# Patient Record
Sex: FEMALE | Race: WHITE
[De-identification: names, ages, dates, MRNs, and addresses within clinical notes are randomized per-mention and may not be internally consistent; named-entity substitution may affect disease eponyms.]

---

## 2017-02-25 NOTE — PN
Hospitalist Progress Note


HOSPITALIST ADDENDUM


Patient seen and examined at bedside.


She states her abdominal pain and nausea are resolved, but feels very tired.


The cause of this episode seems to be non-compliance as she missed her Lantus 

dose yesterday and does not remember how much Humalog she was taking. No signs 

or symptoms of infection at this time.


AG is now closed, glucose is <200. Will resume Lantus, start diet, continue IVF 

and titrate insulin drip to off.


Continue to monitor in ICU.

## 2017-02-25 NOTE — ED
George SEWELL Salem, scribed for [place] on 02/25/17 at 0452 .





HPI Diabetic





- HPI Summary


HPI Summary: 


Patient is a 20 y/o female who presents to the ED for acute vomiting and nausea 

since last night begin at 2230. She reports dyspnea, abd pain, and dehydration. 

Pt denies fever. She reports having EtOH at 1900. 





- History Of Current Complaint


Chief Complaint: EDDiabeticProb


Time Seen by Provider: 02/25/17 04:36


Hx Obtained From: Patient


Onset/Duration: Sudden Onset


Severity Initially: Moderate


Severity Currently: Moderate


Aggravating: Nothing


Alleviating: Nothing


Associated Signs & Symptoms: Abdominal Pain, Nausea, Shortness of Breath, 

Vomiting





- Allergies/Home Medications


Allergies/Adverse Reactions: 


 Allergies











Allergy/AdvReac Type Severity Reaction Status Date / Time


 


No Known Allergies Allergy   Verified 02/25/17 04:47














PMH/Surg Hx/FS Hx/Imm Hx


Endocrine/Hematology History: Reports: Hx Diabetes


Infectious Disease History: No


Infectious Disease History: 


   Denies: Traveled Outside the [place] in Last 30 Days





- Family History


Known Family History: Positive: Diabetes - brother. 


   Negative: Cardiac Disease





- Social History


Alcohol Use: Occasionally


Hx Tobacco Use: No





Review of Systems


Positive: Other - Dehydration. .  Negative: Fever


Positive: Shortness Of Breath


Positive: Abdominal Pain, Vomiting, Nausea


All Other Systems Reviewed And Are Negative: Yes





Physical Exam


Triage Information Reviewed: Yes


Vital Signs On Initial Exam: 


 Initial Vitals











Temp Pulse Resp BP Pulse Ox


 


 98.4 F   115   26   89/63   97 


 


 02/25/17 04:26  02/25/17 04:26  02/25/17 04:26  02/25/17 04:26  02/25/17 04:26











Vital Signs Reviewed: Yes


Appearance: Positive: No Pain Distress


Skin: Positive: Warm, Skin Color Reflects Adequate Perfusion, Dry


Head/Face: Positive: Normal Head/Face Inspection


Eyes: Positive: EOMI, MADHAVI


ENT: Positive: Other - DMM.


Neck: Positive: Supple, Nontender


Respiratory/Lung Sounds: Positive: Clear to Auscultation, Breath Sounds Present


Cardiovascular: Positive: RRR, Pulses are Symmetrical in both Upper and Lower 

Extremities


Abdomen Description: Positive: Other: - Tenderness in right and left lower 

quadrant.


Bowel Sounds: Positive: Present


Musculoskeletal: Positive: Normal, Strength/ROM Intact


Neurological: Positive: Normal, Sensory/Motor Intact, Alert, Oriented to Person 

Place, Time





Diagnostics





- Vital Signs


 Vital Signs











  Temp Pulse Resp BP Pulse Ox


 


 02/25/17 04:26  98.4 F  115  26  89/63  97














- Laboratory


Result Diagrams: 


 02/25/17 04:42





 02/25/17 04:42


Lab Statement: Any lab studies that have been ordered have been reviewed, and 

results considered in the medical decision making process.





- EKG


  ** 0428


EKG Interpretation: Sinus tachycardia @ 111 bmp.





Diabetic Course/Dx





- Diagnoses


Provider Diagnoses: 


 DKA (diabetic ketoacidoses), Abdominal pain








- Physician Notifications


Discussed Care of Patient With: Dr. Frankenber (hospitalist) @ 0514. Will admit.





- Critical Care Time


Critical Care Time: 30-74 min - DKA.





Discharge





- Discharge Plan


Condition: Stable


Disposition: ADMITTED TO [facility]





The documentation as recorded by the George valadez Salem accurately reflects 

the service I personally performed and the decisions made by Emile madden Emmanuel.

## 2017-02-25 NOTE — HP
H&P (Free Text)


History and Physical: 





PCP: Herrera





Date/Time of Evaluation: 02/25/2017 0530





CC: abdominal cramping, N/V, hyperglycemia





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 

being in her usual state of health yesterday until around 2200 when she began 

having diffuse moderate abdominal cramping associated w/ N/V/D. She admits to 

dry cough, but denies F/C, headache, sore throat, B/U/F of urine, or other 

issues. She denies chest pain, SOB, palpitations, light-headedness, or other 

issues.





Work up is notable for a glucose of 633 w/ a serum CO2 of 11 and pH of 7.0. 

Admission will be to ICU for management of DKA. She states she has only been 

hospitalized for diabetic issues twice in her life.





PMedHx


IDDM





Allergies


No Known Allergies Allergy (Verified 02/25/17 04:47)





Ambulatory Orders


Humalog  02/25/17 


Lantus 38 units INJ DAILY 02/25/17 





PSurgHx


L lateral thigh I&D 2nd abscess from insulin pump





SocHx: denies tobacco and recreational drugs, social alcohol; single, no 

children; [employer] student studying computer science; full code status





FamHx: twin brother: IDDM





ROS: as above, otherwise reviewed and all were negative





Constitutional: NAD, normally developed, well-nourished white female


 


vitals: 


 Vital Signs











Temp  36.9 C   02/25/17 06:10


 


Pulse  112   02/25/17 06:10


 


Resp  16   02/25/17 06:10


 


BP  105/74   02/25/17 06:10


 


Pulse Ox  99   02/25/17 06:10








 Intake & Output











 02/24/17 02/24/17 02/25/17





 11:59 23:59 11:59


 


Intake Total   2000


 


Balance   2000


 


Weight   61.235 kg


 


Intake:   


 


  IV Fluids   2000








HEENM: atraumatic; sclera/conjunctiva: non-icteric/clear; hearing: clinically 

intact; oropharynx: clear, mucosa moist





Neck: soft tissue: non-tender; thyroid: normal





Pulmonary: clear to auscultation bilaterally, good aeration, no accessory 

muscle use





CV: RR/RR, normal S1S2, no carotid bruit, no jugular venous distention, 2+ B DP/

PT, no edema





Abdominal: soft, non-distended, mildly diffusely tender, no rebound/guarding/

rigidity, normoactive bowel sounds, no hepatosplenomegaly or masses, no 

costovertebral angle tenderness





Musculoskeletal: general: grossly intact; gait: stable





Integumental: normal appearance and texture





Psychiatric 


orientation: AA&O to PPS


affect: calm


mood: cooperative


eye contact: fair


content: reliable


responses: timely


insight: fair to good





Testing: 


 Lab Results











  02/25/17 02/25/17 02/25/17 Range/Units





  04:42 04:42 04:42 


 


WBC  10.1    (3.5-10.8)  10^3/ul


 


RBC  4.68    (4.0-5.4)  10^6/ul


 


Hgb  13.9    (12.0-16.0)  g/dl


 


Hct  43    (35-47)  %


 


MCV  92    (80-97)  fL


 


MCH  30    (27-31)  pg


 


MCHC  32    (31-36)  g/dl


 


RDW  13    (10.5-15)  %


 


Plt Count  166    (150-450)  10^3/ul


 


MPV  10    (7.4-10.4)  um3


 


Neut % (Auto)  87.9 H    (38-83)  %


 


Lymph % (Auto)  8.4 L    (25-47)  %


 


Mono % (Auto)  2.6    (1-9)  %


 


Eos % (Auto)  0.3    (0-6)  %


 


Baso % (Auto)  0.8    (0-2)  %


 


Absolute Neuts (auto)  8.9 H    (1.5-7.7)  10^3/ul


 


Absolute Lymphs (auto)  0.8 L    (1.0-4.8)  10^3/ul


 


Absolute Monos (auto)  0.3    (0-0.8)  10^3/ul


 


Absolute Eos (auto)  0    (0-0.6)  10^3/ul


 


Absolute Basos (auto)  0.1    (0-0.2)  10^3/ul


 


Absolute Nucleated RBC  0    10^3/ul


 


Nucleated RBC %  0    


 


VBG pH     (7.33-7.43)  


 


VBG pCO2     (41-51)  mmHg


 


VBG pO2     (35-45)  mmHg


 


VBG HCO3     (24-28)  mmol/L


 


VBG O2 Saturation     (70-80)  %


 


VBG Base Excess     (0-4)  


 


Sodium   132 L   (133-145)  mmol/L


 


Potassium   TNP   


 


Chloride   95 L   (101-111)  mmol/L


 


Carbon Dioxide   11 L*   (22-32)  mmol/L


 


Anion Gap   TNP   


 


BUN   17   (6-24)  mg/dL


 


Creatinine   0.88   (0.51-0.95)  mg/dL


 


Est GFR ( Amer)   103.3   (>60)  


 


Est GFR (Non-Af Amer)   80.4   (>60)  


 


BUN/Creatinine Ratio   19.3   (8-20)  


 


Glucose   633 H*   ()  mg/dL


 


POC Glucose (mg/dL)     ()  mg/dL


 


Lactic Acid    2.4 H*  (0.5-2.0)  mmol/L


 


Calcium   9.7   (8.6-10.3)  mg/dL


 


Magnesium   2.0   (1.9-2.7)  mg/dL


 


Total Bilirubin   0.60   (0.2-1.0)  mg/dL


 


AST   TNP   


 


ALT   28   (7-52)  U/L


 


Alkaline Phosphatase   88   ()  U/L


 


Total Protein   7.3   (6.4-8.9)  g/dL


 


Albumin   4.3   (3.2-5.2)  g/dL


 


Globulin   3.0   (2-4)  g/dL


 


Albumin/Globulin Ratio   1.4   (1-3)  


 


Lipase   < 10 L   (11.0-82.0)  U/L


 


TSH   0.76   (0.34-5.60)  mcIU/mL


 


Beta HCG, Quant   < 0.60   mIU/mL














  02/25/17 02/25/17 02/25/17 Range/Units





  04:42 04:46 05:24 


 


WBC     (3.5-10.8)  10^3/ul


 


RBC     (4.0-5.4)  10^6/ul


 


Hgb     (12.0-16.0)  g/dl


 


Hct     (35-47)  %


 


MCV     (80-97)  fL


 


MCH     (27-31)  pg


 


MCHC     (31-36)  g/dl


 


RDW     (10.5-15)  %


 


Plt Count     (150-450)  10^3/ul


 


MPV     (7.4-10.4)  um3


 


Neut % (Auto)     (38-83)  %


 


Lymph % (Auto)     (25-47)  %


 


Mono % (Auto)     (1-9)  %


 


Eos % (Auto)     (0-6)  %


 


Baso % (Auto)     (0-2)  %


 


Absolute Neuts (auto)     (1.5-7.7)  10^3/ul


 


Absolute Lymphs (auto)     (1.0-4.8)  10^3/ul


 


Absolute Monos (auto)     (0-0.8)  10^3/ul


 


Absolute Eos (auto)     (0-0.6)  10^3/ul


 


Absolute Basos (auto)     (0-0.2)  10^3/ul


 


Absolute Nucleated RBC     10^3/ul


 


Nucleated RBC %     


 


VBG pH    7.09 L  (7.33-7.43)  


 


VBG pCO2    27 L  (41-51)  mmHg


 


VBG pO2    49 H  (35-45)  mmHg


 


VBG HCO3    8.8 L  (24-28)  mmol/L


 


VBG O2 Saturation    81.7 H  (70-80)  %


 


VBG Base Excess    -20.2 L  (0-4)  


 


Sodium     (133-145)  mmol/L


 


Potassium  4.4    


 


Chloride     (101-111)  mmol/L


 


Carbon Dioxide     (22-32)  mmol/L


 


Anion Gap     


 


BUN     (6-24)  mg/dL


 


Creatinine     (0.51-0.95)  mg/dL


 


Est GFR ( Amer)     (>60)  


 


Est GFR (Non-Af Amer)     (>60)  


 


BUN/Creatinine Ratio     (8-20)  


 


Glucose     ()  mg/dL


 


POC Glucose (mg/dL)   > 444 H*   ()  mg/dL


 


Lactic Acid     (0.5-2.0)  mmol/L


 


Calcium     (8.6-10.3)  mg/dL


 


Magnesium     (1.9-2.7)  mg/dL


 


Total Bilirubin     (0.2-1.0)  mg/dL


 


AST  33    


 


ALT     (7-52)  U/L


 


Alkaline Phosphatase     ()  U/L


 


Total Protein     (6.4-8.9)  g/dL


 


Albumin     (3.2-5.2)  g/dL


 


Globulin     (2-4)  g/dL


 


Albumin/Globulin Ratio     (1-3)  


 


Lipase     (11.0-82.0)  U/L


 


TSH     (0.34-5.60)  mcIU/mL


 


Beta HCG, Quant     mIU/mL








ECG, personally reviewed: sinus tachycardia rate 111, no ischemia





CXR, personally reviewed: ordered, pending





Impression: 21F HX IDDM presenting w/ DKA, no obvious etiology





DIAGNOSIS & PLAN


Primary 


DKA


: ICU monitoring


: insulin GTT to correct acidosis


: aggressive IVFs for volume repletion


: close monitoring of electrolytes


: supplemental oxygen


: supportive care





Admission Rational: inpatient for ICU management of life-threatening DKA


DVTp: GEORGE


Code Status: full

## 2017-02-25 NOTE — RAD
INDICATION: Diabetic ketoacidosis. Possible infection.     



COMPARISON: None

 

TECHNIQUE: An AP portable view obtained at 0805 hours is submitted.



FINDINGS: 



Bones/Soft Tissues: There are no acute bony findings.    



Cardiomediastinal: The cardiomediastinal silhouette is normal. 



Lungs: There are no infiltrates.



Pleura: There are no pleural effusions. 



Other: None



IMPRESSION:  NO ACTIVE DISEASE.

## 2017-02-26 NOTE — DS
DISCHARGE SUMMARY:

 

DATE OF ADMISSION:  02/25/17

 

DATE OF DISCHARGE:  02/26/17

 

DISCHARGE DIAGNOSIS:  Diabetic ketoacidosis.

 

SECONDARY DIAGNOSIS:  Type 1 diabetes.

 

HOSPITAL COURSE:  Ms. Mcduffie is a 21-year-old lady with a past medical as stated 
above that presented to the emergency room with complaints of abdominal pain, 
nausea, vomiting that had started the night prior to admission.  Although she 
denied any other symptoms or precipitating factors, she was found to be in DKA 
with a glucose of 633, pH of 7.09.  She was admitted to the intensive care unit 
for further management and although, she denied any precipitating factors.  
Later on, she admitted that she had skipped a dose of Lantus and she has not 
been using her Humalog sliding scale as she should.

 

The patient was started on an insulin drip, aggressive IV fluid, and 
electrolyte replacement, and she had progressive improvement.  Her glucose 
level normalized, her anion gap closed, and her acidosis is much improved.

 

The patient was able to tolerate an oral diet and was transitioned back to her 
regimen of Lantus and Humalog.

 

The patient received education regarding her diagnosis and the importance of 
followup.  She will follow up at [place] and she was also referred to the 
[facility] to help her manage her diabetes.

 

The patient states that she has enough insulin at home and she will just need 
more needles, but she did not want me to send prescriptions to a local 
pharmacy.  She states that she will pick them up at [place].

 

The patient is medically stable for discharge at this time.

 

PHYSICAL EXAMINATION:  Vital Signs:  Temperature 97.6, heart rate is 95, 
respiratory rate is 16, oxygen saturation 98% on room air, blood pressure is 91/
58. General:  The patient is a young lady, sitting up in bed, in no acute 
distress. CVS:  Normal S1, S2.  Regular rate and rhythm.  Chest:  Breath sounds 
present bilaterally with no added sounds.  Extremities:  No edema.  Neuro:  She 
is alert, awake, and oriented x3.  Able to move all 4 extremities.

 

DIET:  Consistent carb diet.

 

ACTIVITY:  As tolerated.

 

DISPOSITION:  To home.

 

STATUS WHILE IN THE HOSPITAL:  Inpatient.

 

If you need more information, please feel free to call me at [phone] or 
please obtain the full medical records.

 

TIME SPENT:  Approximately 45 minutes were spent to complete this discharge.



CC:  *

Dr. Yadira Barnes, Medical Director, [facility], phone number is [phone]; 

Nuha Oneill NP, [facility]



 

 [id number]/CPS #: [id number]

[facility] HospitalCLAYTON

## 2018-04-16 ENCOUNTER — HOSPITAL ENCOUNTER (EMERGENCY)
Dept: HOSPITAL 25 - ED | Age: 23
Discharge: HOME | End: 2018-04-16
Payer: COMMERCIAL

## 2018-04-16 VITALS — SYSTOLIC BLOOD PRESSURE: 105 MMHG | DIASTOLIC BLOOD PRESSURE: 65 MMHG

## 2018-04-16 DIAGNOSIS — E10.65: Primary | ICD-10-CM

## 2018-04-16 DIAGNOSIS — F41.9: ICD-10-CM

## 2018-04-16 LAB
BASOPHILS # BLD AUTO: 0.1 10^3/UL (ref 0–0.2)
EOSINOPHIL # BLD AUTO: 0.1 10^3/UL (ref 0–0.6)
HCT VFR BLD AUTO: 41 % (ref 35–47)
HGB BLD-MCNC: 13.8 G/DL (ref 12–16)
LYMPHOCYTES # BLD AUTO: 1.3 10^3/UL (ref 1–4.8)
MCH RBC QN AUTO: 30 PG (ref 27–31)
MCHC RBC AUTO-ENTMCNC: 34 G/DL (ref 31–36)
MCV RBC AUTO: 89 FL (ref 80–97)
MONOCYTES # BLD AUTO: 0.3 10^3/UL (ref 0–0.8)
NEUTROPHILS # BLD AUTO: 6.4 10^3/UL (ref 1.5–7.7)
NRBC # BLD AUTO: 0 10^3/UL
NRBC BLD QL AUTO: 0
PLATELET # BLD AUTO: 227 10^3/UL (ref 150–450)
RBC # BLD AUTO: 4.59 10^6/UL (ref 4–5.4)
WBC # BLD AUTO: 8.2 10^3/UL (ref 3.5–10.8)

## 2018-04-16 PROCEDURE — 36415 COLL VENOUS BLD VENIPUNCTURE: CPT

## 2018-04-16 PROCEDURE — 80320 DRUG SCREEN QUANTALCOHOLS: CPT

## 2018-04-16 PROCEDURE — 85025 COMPLETE CBC W/AUTO DIFF WBC: CPT

## 2018-04-16 PROCEDURE — 86140 C-REACTIVE PROTEIN: CPT

## 2018-04-16 PROCEDURE — 80307 DRUG TEST PRSMV CHEM ANLYZR: CPT

## 2018-04-16 PROCEDURE — 84702 CHORIONIC GONADOTROPIN TEST: CPT

## 2018-04-16 PROCEDURE — 87086 URINE CULTURE/COLONY COUNT: CPT

## 2018-04-16 PROCEDURE — 80053 COMPREHEN METABOLIC PANEL: CPT

## 2018-04-16 PROCEDURE — G0480 DRUG TEST DEF 1-7 CLASSES: HCPCS

## 2018-04-16 PROCEDURE — 82803 BLOOD GASES ANY COMBINATION: CPT

## 2018-04-16 PROCEDURE — 83605 ASSAY OF LACTIC ACID: CPT

## 2018-04-16 PROCEDURE — 80329 ANALGESICS NON-OPIOID 1 OR 2: CPT

## 2018-04-16 PROCEDURE — 99283 EMERGENCY DEPT VISIT LOW MDM: CPT

## 2018-04-16 PROCEDURE — 81015 MICROSCOPIC EXAM OF URINE: CPT

## 2018-04-16 PROCEDURE — 81003 URINALYSIS AUTO W/O SCOPE: CPT

## 2018-04-16 NOTE — ED
Amira SEWELL Edward, scribed for Carlos Eduardo Escobar MD on 04/16/18 at 1530 .





HPI Diabetic





- HPI Summary


HPI Summary: 





21 y/o female BIBA, sent from [place] for concerns of DKA. Pt originally went 

to [place] today c/o anxiety over "grad school". At [facility] her blood 

sugar was checked and it was reported high; sent to ED. Pt refused treatment. 

945 called. PMHx DM (since she was 6). Past medications reviewed on visit.





- History Of Current Complaint


Time Seen by Provider: 04/16/18 15:23


Hx Obtained From: Patient


Onset/Duration: Other


Character: Alert


Aggravating: Nothing


Alleviating: Nothing


Associated Signs & Symptoms: Negative





- Allergies/Home Medications


Allergies/Adverse Reactions: 


 Allergies











Allergy/AdvReac Type Severity Reaction Status Date / Time


 


No Known Allergies Allergy   Verified 02/25/17 04:47











Home Medications: 


 Home Medications





Insulin GLARGINE(*) [Lantus(*)] 38 units SUBCUT QAM 04/16/18 [History Confirmed 

04/16/18]


Insulin LISPRO* [HumaLOG*] 40 units SUBCUT DAILY 04/16/18 [History Confirmed 04/ 16/18]











PMH/Surg Hx/FS Hx/Imm Hx


Previously Healthy: No


Endocrine/Hematology History: Reports: Hx Diabetes


Cardiovascular History: Reports: Other Cardiovascular Problems/Disorders - 

DIABETIC


Sensory History: Reports: Hx Contacts or Glasses - not with pt


Opthamlomology History: Reports: Hx Contacts or Glasses - not with pt


Neurological History: Reports: Hx Seizures - 2nd grade, none since





- Family History


Known Family History: Positive: Diabetes - brother. 


   Negative: Cardiac Disease





- Social History


Alcohol Use: Occasionally


Alcohol Amount: States only a social drinker.


Substance Use Type: Reports: None


Hx Tobacco Use: No


Smoking Status (MU): Never Smoked Tobacco





Review of Systems


Constitutional: Negative


Eyes: Negative


ENT: Negative


Cardiovascular: Negative


Respiratory: Negative


Gastrointestinal: Negative


Genitourinary: Negative


Musculoskeletal: Negative


Skin: Negative


Neurological: Negative


Positive: Anxious


All Other Systems Reviewed And Are Negative: Yes





Physical Exam





- Summary


Physical Exam Summary: 





Appearance: The patient is well-nourished in no acute distress and in no acute 

pain.


 


Skin: The skin is warm and dry and skin color reflects adequate perfusion.


 


HEENT: The head is normocephalic and atraumatic. The pupils are equal and 

reactive. The conjunctivae are clear and without drainage. Nares are patent and 

without drainage. Mouth reveals moist mucous membranes and the throat is 

without erythema and exudate. The external ears are intact. The ear canals are 

patent and without drainage. The tympanic membranes are intact.


 


Neck: the neck is supple with full range of motion and non-tender. There are no 

carotid bruits. There is no neck vein distension.


 


Respiratory: Chest is non-tender. Lungs are clear to auscultation and breath 

sounds are symmetrical and equal.


 


Cardiovascular: Heart is regular rate and rhythm. There is no murmur or rub 

auscultated. There is no peripheral edema and pulses are symmetrical and equal.


 


Abdomen: The abdomen is soft and non-tender. There are normal bowel sounds 

heard in all four quadrants and there is no organomegaly palpated.


 


Musculoskeletal: There is no back tenderness noted. Extremities are non-tender 

with full range of motion. There is good capillary refill. There is no 

peripheral edema or calf tenderness elicited.


 


Neurological: Patient is alert and oriented to person, place and time. The 

patient has symmetrical motor strength in all four extremities. Cranial nerves 

are grossly intact. Deep tendon reflexes are symmetrical and equal in all four 

extremities.


 


Psychiatric: The patient has an appropriate affect and does not exhibit any 

anxiety or depression.


Triage Information Reviewed: Yes


Vital Signs On Initial Exam: 


 Initial Vitals











Temp Pulse Resp BP Pulse Ox


 


 98.5 F   109   16   115/80   98 


 


 04/16/18 15:28  04/16/18 15:28  04/16/18 15:28  04/16/18 15:28  04/16/18 15:28











Vital Signs Reviewed: Yes





Diagnostics





- Vital Signs


 Vital Signs











  Temp Pulse Resp BP Pulse Ox


 


 04/16/18 20:10  98 F  98  16  105/65  97


 


 04/16/18 18:30   102  18  102/65  98


 


 04/16/18 15:31   104    97


 


 04/16/18 15:29     115/80 


 


 04/16/18 15:28  98.5 F  109  16  115/80  98














- Laboratory


Lab Results: 


 Lab Results











  04/16/18 04/16/18 04/16/18 Range/Units





  15:48 15:48 15:48 


 


WBC   8.2   (3.5-10.8)  10^3/ul


 


RBC   4.59   (4.0-5.4)  10^6/ul


 


Hgb   13.8   (12.0-16.0)  g/dl


 


Hct   41   (35-47)  %


 


MCV   89   (80-97)  fL


 


MCH   30   (27-31)  pg


 


MCHC   34   (31-36)  g/dl


 


RDW   12   (10.5-15)  %


 


Plt Count   227   (150-450)  10^3/ul


 


MPV   9.2   (7.4-10.4)  um3


 


Neut % (Auto)   78.7   (38-83)  %


 


Lymph % (Auto)   15.5 L   (25-47)  %


 


Mono % (Auto)   4.0   (0-7)  %


 


Eos % (Auto)   0.8   (0-6)  %


 


Baso % (Auto)   1.0   (0-2)  %


 


Absolute Neuts (auto)   6.4   (1.5-7.7)  10^3/ul


 


Absolute Lymphs (auto)   1.3   (1.0-4.8)  10^3/ul


 


Absolute Monos (auto)   0.3   (0-0.8)  10^3/ul


 


Absolute Eos (auto)   0.1   (0-0.6)  10^3/ul


 


Absolute Basos (auto)   0.1   (0-0.2)  10^3/ul


 


Absolute Nucleated RBC   0   10^3/ul


 


Nucleated RBC %   0   


 


VBG pH     (7.33-7.43)  


 


VBG pCO2     (41-51)  mmHg


 


VBG pO2     (35-45)  mmHg


 


VBG HCO3     (24-28)  mmol/L


 


VBG O2 Saturation     (70-80)  %


 


VBG Base Excess     (0-4)  


 


Sodium  138 L    (139-145)  mmol/L


 


Potassium  3.7    (3.5-5.0)  mmol/L


 


Chloride  103    (101-111)  mmol/L


 


Carbon Dioxide  27    (22-32)  mmol/L


 


Anion Gap  8    (2-11)  mmol/L


 


BUN  14    (6-24)  mg/dL


 


Creatinine  0.59    (0.51-0.95)  mg/dL


 


Est GFR ( Amer)  162.4    (>60)  


 


Est GFR (Non-Af Amer)  126.3    (>60)  


 


BUN/Creatinine Ratio  23.7 H    (8-20)  


 


Glucose  244 H    ()  mg/dL


 


Lactic Acid    1.0  (0.5-2.0)  mmol/L


 


Calcium  9.2    (8.6-10.3)  mg/dL


 


Total Bilirubin  0.60    (0.2-1.0)  mg/dL


 


AST  10 L    (13-39)  U/L


 


ALT  13    (7-52)  U/L


 


Alkaline Phosphatase  59    ()  U/L


 


C-Reactive Protein  3.71    (< 5.00)  mg/L


 


Total Protein  6.8    (6.4-8.9)  g/dL


 


Albumin  4.1    (3.2-5.2)  g/dL


 


Globulin  2.7    (2-4)  g/dL


 


Albumin/Globulin Ratio  1.5    (1-3)  


 


Beta HCG, Quant  < 0.60    mIU/mL


 


Urine Color     


 


Urine Appearance     


 


Urine pH     (5-9)  


 


Ur Specific Gravity     (1.010-1.030)  


 


Urine Protein     (Negative)  


 


Urine Ketones     (Negative)  


 


Urine Blood     (Negative)  


 


Urine Nitrate     (Negative)  


 


Urine Bilirubin     (Negative)  


 


Urine Urobilinogen     (Negative)  


 


Ur Leukocyte Esterase     (Negative)  


 


Urine WBC (Auto)     (Absent)  


 


Urine RBC (Auto)     (Absent)  


 


Ur Squamous Epith Cells     (Absent)  


 


Urine Bacteria     (Absent)  


 


Urine Glucose     (Negative)  


 


Salicylates  < 2.50    (<30)  mg/dL


 


Urine Opiates Screen     (None Detect)  


 


Acetaminophen  < 15    mcg/mL


 


Ur Barbiturates Screen     (None Detect)  


 


Ur Phencyclidine Scrn     (None Detect)  


 


Ur Amphetamines Screen     (None Detect)  


 


U Benzodiazepines Scrn     (None Detect)  


 


Urine Cocaine Screen     (None Detect)  


 


U Cannabinoids Screen     (None Detect)  


 


Serum Alcohol  < 10    (<10)  mg/dL














  04/16/18 04/16/18 04/16/18 Range/Units





  16:00 16:20 16:20 


 


WBC     (3.5-10.8)  10^3/ul


 


RBC     (4.0-5.4)  10^6/ul


 


Hgb     (12.0-16.0)  g/dl


 


Hct     (35-47)  %


 


MCV     (80-97)  fL


 


MCH     (27-31)  pg


 


MCHC     (31-36)  g/dl


 


RDW     (10.5-15)  %


 


Plt Count     (150-450)  10^3/ul


 


MPV     (7.4-10.4)  um3


 


Neut % (Auto)     (38-83)  %


 


Lymph % (Auto)     (25-47)  %


 


Mono % (Auto)     (0-7)  %


 


Eos % (Auto)     (0-6)  %


 


Baso % (Auto)     (0-2)  %


 


Absolute Neuts (auto)     (1.5-7.7)  10^3/ul


 


Absolute Lymphs (auto)     (1.0-4.8)  10^3/ul


 


Absolute Monos (auto)     (0-0.8)  10^3/ul


 


Absolute Eos (auto)     (0-0.6)  10^3/ul


 


Absolute Basos (auto)     (0-0.2)  10^3/ul


 


Absolute Nucleated RBC     10^3/ul


 


Nucleated RBC %     


 


VBG pH  7.35    (7.33-7.43)  


 


VBG pCO2  52 H    (41-51)  mmHg


 


VBG pO2  17 L    (35-45)  mmHg


 


VBG HCO3  24.5    (24-28)  mmol/L


 


VBG O2 Saturation  27.4 L    (70-80)  %


 


VBG Base Excess  2.0    (0-4)  


 


Sodium     (139-145)  mmol/L


 


Potassium     (3.5-5.0)  mmol/L


 


Chloride     (101-111)  mmol/L


 


Carbon Dioxide     (22-32)  mmol/L


 


Anion Gap     (2-11)  mmol/L


 


BUN     (6-24)  mg/dL


 


Creatinine     (0.51-0.95)  mg/dL


 


Est GFR ( Amer)     (>60)  


 


Est GFR (Non-Af Amer)     (>60)  


 


BUN/Creatinine Ratio     (8-20)  


 


Glucose     ()  mg/dL


 


Lactic Acid     (0.5-2.0)  mmol/L


 


Calcium     (8.6-10.3)  mg/dL


 


Total Bilirubin     (0.2-1.0)  mg/dL


 


AST     (13-39)  U/L


 


ALT     (7-52)  U/L


 


Alkaline Phosphatase     ()  U/L


 


C-Reactive Protein     (< 5.00)  mg/L


 


Total Protein     (6.4-8.9)  g/dL


 


Albumin     (3.2-5.2)  g/dL


 


Globulin     (2-4)  g/dL


 


Albumin/Globulin Ratio     (1-3)  


 


Beta HCG, Quant     mIU/mL


 


Urine Color    Yellow  


 


Urine Appearance    Clear  


 


Urine pH    5.0  (5-9)  


 


Ur Specific Gravity    1.032 H  (1.010-1.030)  


 


Urine Protein    Negative  (Negative)  


 


Urine Ketones    2+ A  (Negative)  


 


Urine Blood    Negative  (Negative)  


 


Urine Nitrate    Negative  (Negative)  


 


Urine Bilirubin    Negative  (Negative)  


 


Urine Urobilinogen    Negative  (Negative)  


 


Ur Leukocyte Esterase    2+ A  (Negative)  


 


Urine WBC (Auto)    2+(11-20/hpf) A  (Absent)  


 


Urine RBC (Auto)    1+(3-5/hpf) A  (Absent)  


 


Ur Squamous Epith Cells    Present A  (Absent)  


 


Urine Bacteria    1+ A  (Absent)  


 


Urine Glucose    3+(>=500 mg/dl) A  (Negative)  


 


Salicylates     (<30)  mg/dL


 


Urine Opiates Screen   None detected   (None Detect)  


 


Acetaminophen     mcg/mL


 


Ur Barbiturates Screen   None detected   (None Detect)  


 


Ur Phencyclidine Scrn   None detected   (None Detect)  


 


Ur Amphetamines Screen   None detected   (None Detect)  


 


U Benzodiazepines Scrn   None detected   (None Detect)  


 


Urine Cocaine Screen   None detected   (None Detect)  


 


U Cannabinoids Screen   None detected   (None Detect)  


 


Serum Alcohol     (<10)  mg/dL











Result Diagrams: 


 04/16/18 15:48





 04/16/18 15:48


Lab Statement: Any lab studies that have been ordered have been reviewed, and 

results considered in the medical decision making process.





Diabetic Course/Dx





- Course


Course Of Treatment: Ms. Mcduffie has been under a lot of stress trying to arrange 

to go to graduate school and went to [place] today to talk with someone. They 

found her BS very high and some ketones in her U/A and wanted to send her over 

here.  She didn't want to come stating that she has a lot of experience with 

her BSs and could handle them on her own.  She took insulin at [place].  They 

felt that she was exhibing self harm behavior and sent her over on a 945.  She 

was stable here and not in DKA so she was medically cleared and had a MHE.  

They felt that she was safe to go home.





- Diagnoses


Provider Diagnoses: 


 Hyperglycemia due to type 1 diabetes mellitus, Anxiety








Discharge





- Sign-Out/Discharge


Documenting (check all that apply): Discharge





- Discharge Plan


Condition: Stable


Disposition: HOME


Referrals: 


[facility] - Andrea MORSE [Primary Care Provider] - If Needed (If you feel 

that you need to speak with someone when you are feeling overwhelmed contact 

the health center.)





- Billing Disposition and Condition


Condition: STABLE


Disposition: HOME





The documentation as recorded by the scribe, Amira,Edward accurately reflects the 

service I personally performed and the decisions made by me, Carlos Eduardo Escobar MD.